# Patient Record
Sex: MALE | Race: OTHER | HISPANIC OR LATINO
[De-identification: names, ages, dates, MRNs, and addresses within clinical notes are randomized per-mention and may not be internally consistent; named-entity substitution may affect disease eponyms.]

---

## 2019-08-27 ENCOUNTER — APPOINTMENT (OUTPATIENT)
Dept: CARDIOLOGY | Facility: CLINIC | Age: 29
End: 2019-08-27
Payer: MEDICAID

## 2019-08-27 VITALS
RESPIRATION RATE: 12 BRPM | TEMPERATURE: 97.2 F | DIASTOLIC BLOOD PRESSURE: 60 MMHG | WEIGHT: 250 LBS | HEART RATE: 89 BPM | HEIGHT: 69 IN | OXYGEN SATURATION: 97 % | SYSTOLIC BLOOD PRESSURE: 116 MMHG | BODY MASS INDEX: 37.03 KG/M2

## 2019-08-27 DIAGNOSIS — Z78.9 OTHER SPECIFIED HEALTH STATUS: ICD-10-CM

## 2019-08-27 DIAGNOSIS — F17.200 NICOTINE DEPENDENCE, UNSPECIFIED, UNCOMPLICATED: ICD-10-CM

## 2019-08-27 DIAGNOSIS — I45.10 UNSPECIFIED RIGHT BUNDLE-BRANCH BLOCK: ICD-10-CM

## 2019-08-27 DIAGNOSIS — Z87.09 PERSONAL HISTORY OF OTHER DISEASES OF THE RESPIRATORY SYSTEM: ICD-10-CM

## 2019-08-27 DIAGNOSIS — Z87.19 PERSONAL HISTORY OF OTHER DISEASES OF THE DIGESTIVE SYSTEM: ICD-10-CM

## 2019-08-27 PROCEDURE — 99204 OFFICE O/P NEW MOD 45 MIN: CPT

## 2019-08-27 NOTE — HISTORY OF PRESENT ILLNESS
[FreeTextEntry1] : 28 yo male with intermittent chest pain over the past 2 months, which is not related to physical exertion. He describes these episodes as pressure-like initially and then sharp in nature which lasts from 30-60 minutes before spontaneously resolving. He has had 6-7 episodes during the past 2 months. Patient denies chest pain, dyspnea, palpitations, syncope, edema, melena, hematochezia, or hematemesis. He reports prior history of GERD but states that these episodes do not feel like his usual heartburn symptoms. He exercises occasional doing cardio but is physically active at work at [employer]. He was recently evaluated at [facility] on 8/23/19 for chest pain. CXR, trops, and D-dimer were negative. Initial ECG demonstrated sinus rhythm with RBBB but repeat ECG ~ 3.5 hours later showed resolution of RBBB. He denies any new stressors at home or work. \par

## 2019-08-27 NOTE — REVIEW OF SYSTEMS
[Chest Pain] : chest pain [Negative] : Heme/Lymph [Shortness Of Breath] : no shortness of breath [Dyspnea on exertion] : not dyspnea during exertion [Lower Ext Edema] : no extremity edema [Leg Claudication] : no intermittent leg claudication [Palpitations] : no palpitations

## 2019-08-27 NOTE — ASSESSMENT
[FreeTextEntry1] : 30 yo male with intermittent chest pain over the past 2 months, which is not related to physical exertion. Recent evaluation on 8/23/19 at [facility] (CXR, trops, and D-dimer were negative), but initial ECG demonstrated sinus rhythm with RBBB. Repeat ECG ~ 3.5 hours later showed resolution of RBBB. \par \par Will perform treadmill stress ECG for ischemic evaluation.\par Will perform echocardiogram to assess LV function and structural heart disease.\par Pending review of test results, will determine if further cardiac work-up or intervention is clinically indicated.\par If cardiac evaluation is unremarkable, will pursue evaluation for GI or musculoskeletal etiology.

## 2019-08-27 NOTE — PHYSICAL EXAM
[General Appearance - Well Developed] : well developed [General Appearance - In No Acute Distress] : no acute distress [General Appearance - Well Nourished] : well nourished [Normal Conjunctiva] : the conjunctiva exhibited no abnormalities [No Oral Cyanosis] : no oral cyanosis [Normal Rate] : normal [FreeTextEntry1] : No JVD present [Rhythm Regular] : regular [Normal S1] : normal S1 [Normal S2] : normal S2 [S3] : no S3 [S4] : no S4 [No Murmur] : no murmurs heard [Left Carotid Bruit] : no bruit heard over the left carotid [Right Carotid Bruit] : no bruit heard over the right carotid [No Pitting Edema] : no pitting edema present [2+] : left 2+ [Respiration, Rhythm And Depth] : normal respiratory rhythm and effort [] : no respiratory distress [Auscultation Breath Sounds / Voice Sounds] : lungs were clear to auscultation bilaterally [Bowel Sounds] : normal bowel sounds [Abdomen Soft] : soft [Abdomen Tenderness] : non-tender [Abnormal Walk] : normal gait [Nail Clubbing] : no clubbing of the fingernails [Cyanosis, Localized] : no localized cyanosis [Affect] : the affect was normal [Oriented To Time, Place, And Person] : oriented to person, place, and time [Mood] : the mood was normal

## 2019-09-03 ENCOUNTER — APPOINTMENT (OUTPATIENT)
Dept: CARDIOLOGY | Facility: CLINIC | Age: 29
End: 2019-09-03
Payer: MEDICAID

## 2019-09-03 PROCEDURE — 93015 CV STRESS TEST SUPVJ I&R: CPT

## 2019-09-12 ENCOUNTER — APPOINTMENT (OUTPATIENT)
Dept: CARDIOLOGY | Facility: CLINIC | Age: 29
End: 2019-09-12
Payer: MEDICAID

## 2019-09-12 PROCEDURE — 93306 TTE W/DOPPLER COMPLETE: CPT

## 2019-09-17 ENCOUNTER — MESSAGE (OUTPATIENT)
Age: 29
End: 2019-09-17

## 2019-09-26 ENCOUNTER — APPOINTMENT (OUTPATIENT)
Dept: FAMILY MEDICINE | Facility: CLINIC | Age: 29
End: 2019-09-26
Payer: COMMERCIAL

## 2019-09-26 VITALS
HEIGHT: 69 IN | SYSTOLIC BLOOD PRESSURE: 118 MMHG | BODY MASS INDEX: 37.03 KG/M2 | WEIGHT: 250 LBS | DIASTOLIC BLOOD PRESSURE: 68 MMHG

## 2019-09-26 DIAGNOSIS — Z76.89 PERSONS ENCOUNTERING HEALTH SERVICES IN OTHER SPECIFIED CIRCUMSTANCES: ICD-10-CM

## 2019-09-26 PROCEDURE — 99214 OFFICE O/P EST MOD 30 MIN: CPT

## 2019-09-27 PROBLEM — Z76.89 ENCOUNTER TO ESTABLISH CARE: Status: ACTIVE | Noted: 2019-09-27

## 2019-09-27 NOTE — HISTORY OF PRESENT ILLNESS
[FreeTextEntry8] : Mr. ANALI RIZZO is a 29 year old male here today for referral for cardiology. Was seen one month ago for evaluation of irregular heartbeat. ECHO and CST were negative. \par Tdap 7/2019 at [facility]\par

## 2019-12-27 ENCOUNTER — CHART COPY (OUTPATIENT)
Age: 29
End: 2019-12-27

## 2020-02-04 ENCOUNTER — APPOINTMENT (OUTPATIENT)
Dept: PODIATRY | Facility: CLINIC | Age: 30
End: 2020-02-04
Payer: MEDICAID

## 2020-02-04 ENCOUNTER — RESULT REVIEW (OUTPATIENT)
Age: 30
End: 2020-02-04

## 2020-02-04 VITALS
SYSTOLIC BLOOD PRESSURE: 126 MMHG | DIASTOLIC BLOOD PRESSURE: 80 MMHG | HEIGHT: 69 IN | BODY MASS INDEX: 37.03 KG/M2 | WEIGHT: 250 LBS

## 2020-02-04 PROCEDURE — 99203 OFFICE O/P NEW LOW 30 MIN: CPT

## 2020-02-04 NOTE — PROCEDURE
[FreeTextEntry1] : differential diagnosis reviewed, xrays reviewed, .xrays show bipartitie tibial sesamoid which could represent a fracture\par After evaluating the patient and examining the area in question and reviewing the x-rays I feel at this time a magnetic resonance imaging is indicated and as a medical necessity and this note will serve as a letter medical necessity. Given the severity of the injury and the mechanism of injury I am concerned about osseous as well as significant soft tissue pathology, injury tear and possible rupture. I feel that an magnetic resonance imaging is the most appropriate diagnostic tests at this time and the patient should be granted authorization for an magnetic resonance imaging\par \par Will get comparative view of right foot.\par

## 2020-02-04 NOTE — PHYSICAL EXAM
[General Appearance - Alert] : alert [General Appearance - In No Acute Distress] : in no acute distress [Full Pulse] : the pedal pulses are present [Edema] : there was no peripheral edema [Skin Color & Pigmentation] : normal skin color and pigmentation [Skin Turgor] : normal skin turgor [] : no rash [Sensation] : the sensory exam was normal to light touch and pinprick [Deep Tendon Reflexes (DTR)] : deep tendon reflexes were 2+ and symmetric [Oriented To Time, Place, And Person] : oriented to person, place, and time [No Focal Deficits] : no focal deficits [Impaired Insight] : insight and judgment were intact [Affect] : the affect was normal [Skin Lesions] : no skin lesions [Motor Exam] : the motor exam was normal [FreeTextEntry1] : pain 1st mtp joint complex left foot, with active and passive ROM
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
## 2020-02-04 NOTE — REVIEW OF SYSTEMS
[As Noted in HPI] : as noted in HPI [Negative] : Heme/Lymph [Leg Claudication] : no intermittent leg claudication [Lower Ext Edema] : no extremity edema [Skin Lesions] : no skin lesions [Skin Wound] : no skin wound [Itching] : no itching [An Unusual Growth] : no unusual growth on the skin [de-identified] : no redness, cellulitis or fluctance

## 2020-02-05 ENCOUNTER — RESULT REVIEW (OUTPATIENT)
Age: 30
End: 2020-02-05

## 2020-02-07 ENCOUNTER — APPOINTMENT (OUTPATIENT)
Dept: PODIATRY | Facility: CLINIC | Age: 30
End: 2020-02-07
Payer: MEDICAID

## 2020-02-07 VITALS
SYSTOLIC BLOOD PRESSURE: 126 MMHG | HEIGHT: 69 IN | WEIGHT: 250 LBS | BODY MASS INDEX: 37.03 KG/M2 | DIASTOLIC BLOOD PRESSURE: 80 MMHG

## 2020-02-07 PROCEDURE — 28470 CLTX METATARSAL FX WO MNP EA: CPT

## 2020-02-07 PROCEDURE — 99213 OFFICE O/P EST LOW 20 MIN: CPT | Mod: 57

## 2020-02-07 NOTE — PROCEDURE
[FreeTextEntry1] : I had a lengthy discussion with the patient regarding immobilization via a cam walker. After reviewing the benefits as well as the risks of being placed in a cam walker the patient has agreed. Next a new cam walker was removed from its packaging and customized to fit the patient's foot and leg and was instructed on how to use the Cam Walker. They were advised that they need to stay in the cam walker at times except showering and sleeping. I advised the patient that they should not drive with a cam walker. The Cam Walker was placed on the appropriate limb, instructions on how to ambulate with the cam walker were reviewed and the patient showed a knowledge on how to walk, remove, and reapply the cam walker. Complete discharge instructions were given as well as a follow up appointment.\par A lengthy discussion with the patient today regarding the difficulty in conservative management of sesamoid fractures. Given the nature of the fracture and location of the bone  there is a history of this fracture taking exessively long times to heal. There is a chance of going on to delayed union, sometimes nonunion, sometime necrosis, and on occasion require surgical excision. At this time we will begin conservative therapy. I placed him in a cam walker and gave him a rx for an orthotist for a custom molded inner sole in the Cam Walker with deep dispersion beneath the sesamoid complex and also advised him to limit the amount of weightbearing he does and return in approximately 4 weeks

## 2020-02-07 NOTE — HISTORY OF PRESENT ILLNESS
[FreeTextEntry1] : Location: left foot dorsally\par Acute: yes\par Chronic:\par Past Tx: ER, xrays, Abx, nsaids, contralateral right foot xray, recent MRI\par Exacerbated by: standing\par

## 2020-02-07 NOTE — REVIEW OF SYSTEMS
[As Noted in HPI] : as noted in HPI [Negative] : Integumentary [Leg Claudication] : no intermittent leg claudication [Lower Ext Edema] : no extremity edema

## 2020-02-07 NOTE — PHYSICAL EXAM
[General Appearance - Alert] : alert [Edema] : there was no peripheral edema [Full Pulse] : the pedal pulses are present [General Appearance - In No Acute Distress] : in no acute distress [Skin Color & Pigmentation] : normal skin color and pigmentation [Skin Turgor] : normal skin turgor [] : no rash [Deep Tendon Reflexes (DTR)] : deep tendon reflexes were 2+ and symmetric [Sensation] : the sensory exam was normal to light touch and pinprick [No Focal Deficits] : no focal deficits [Oriented To Time, Place, And Person] : oriented to person, place, and time [Affect] : the affect was normal [Impaired Insight] : insight and judgment were intact [FreeTextEntry1] : MRI evaluation of the left foot does reveal a fracture of the tibial sesamoid. The case was discussed and MRI are reviewed with Dr. Reeder musculoskeletal radiologist prior to seeing the patient

## 2020-03-06 ENCOUNTER — APPOINTMENT (OUTPATIENT)
Dept: PODIATRY | Facility: CLINIC | Age: 30
End: 2020-03-06
Payer: MEDICAID

## 2020-03-06 VITALS
SYSTOLIC BLOOD PRESSURE: 126 MMHG | WEIGHT: 250 LBS | HEIGHT: 69 IN | BODY MASS INDEX: 37.03 KG/M2 | DIASTOLIC BLOOD PRESSURE: 80 MMHG

## 2020-03-06 PROCEDURE — 99024 POSTOP FOLLOW-UP VISIT: CPT

## 2020-03-09 NOTE — REVIEW OF SYSTEMS
[As Noted in HPI] : as noted in HPI [Negative] : Neurological [Leg Claudication] : no intermittent leg claudication [Lower Ext Edema] : no extremity edema

## 2020-03-09 NOTE — PROCEDURE
[FreeTextEntry1] : Will need follow up dx study to rule out aseptic necrosis of bone\par Continue offloading with CAM\par dancers pads dispensed for the skin to help offload the area\par I have applied offloading pads to the patient's foot and have given them several samples to continue to use. I have also advised the patient that they can certainly purchase these from an outside vendor and if they are willing to do that and the name and number was supplied\par

## 2020-03-09 NOTE — HISTORY OF PRESENT ILLNESS
[FreeTextEntry1] : Location: left foot\par Acute: yes\par Chronic:\par Past Tx: ER, xrays, Abx, nsaids, contralateral right foot xray, MRI, CAM with dispersion\par Exacerbated by: standing, work\par

## 2020-03-09 NOTE — PHYSICAL EXAM
[General Appearance - Alert] : alert [General Appearance - In No Acute Distress] : in no acute distress [Full Pulse] : the pedal pulses are present [Edema] : there was no peripheral edema [Deep Tendon Reflexes (DTR)] : deep tendon reflexes were 2+ and symmetric [Sensation] : the sensory exam was normal to light touch and pinprick [No Focal Deficits] : no focal deficits [Oriented To Time, Place, And Person] : oriented to person, place, and time [Impaired Insight] : insight and judgment were intact [Affect] : the affect was normal [FreeTextEntry1] : tissue bulging fluctuant, ??fluid sub 1st

## 2020-08-17 ENCOUNTER — APPOINTMENT (OUTPATIENT)
Dept: FAMILY MEDICINE | Facility: CLINIC | Age: 30
End: 2020-08-17
Payer: MEDICAID

## 2020-08-17 ENCOUNTER — RESULT REVIEW (OUTPATIENT)
Age: 30
End: 2020-08-17

## 2020-08-17 VITALS
DIASTOLIC BLOOD PRESSURE: 70 MMHG | SYSTOLIC BLOOD PRESSURE: 120 MMHG | TEMPERATURE: 98 F | BODY MASS INDEX: 36.29 KG/M2 | WEIGHT: 245 LBS | HEIGHT: 69 IN

## 2020-08-17 DIAGNOSIS — Z87.39 PERSONAL HISTORY OF OTHER DISEASES OF THE MUSCULOSKELETAL SYSTEM AND CONNECTIVE TISSUE: ICD-10-CM

## 2020-08-17 PROCEDURE — G0444 DEPRESSION SCREEN ANNUAL: CPT

## 2020-08-17 PROCEDURE — 99213 OFFICE O/P EST LOW 20 MIN: CPT | Mod: 25

## 2020-08-17 RX ORDER — ONDANSETRON 4 MG/1
4 TABLET, ORALLY DISINTEGRATING ORAL
Qty: 8 | Refills: 0 | Status: DISCONTINUED | COMMUNITY
Start: 2019-12-26 | End: 2020-08-17

## 2020-08-17 RX ORDER — CEPHALEXIN 500 MG/1
500 CAPSULE ORAL
Qty: 30 | Refills: 0 | Status: DISCONTINUED | COMMUNITY
Start: 2020-02-02 | End: 2020-08-17

## 2020-08-17 RX ORDER — IBUPROFEN 800 MG/1
800 TABLET, FILM COATED ORAL
Qty: 50 | Refills: 0 | Status: DISCONTINUED | COMMUNITY
Start: 2020-02-02 | End: 2020-08-17

## 2020-08-17 RX ORDER — KETOROLAC TROMETHAMINE 10 MG/1
10 TABLET, FILM COATED ORAL 3 TIMES DAILY
Qty: 21 | Refills: 1 | Status: DISCONTINUED | COMMUNITY
Start: 2020-02-04 | End: 2020-08-17

## 2020-08-17 NOTE — REVIEW OF SYSTEMS
[Back Pain] : back pain [Negative] : Respiratory [de-identified] : no numbness, tingling, weakness or radiation of pain

## 2020-08-17 NOTE — PHYSICAL EXAM
[Normal Gait] : normal gait [No Spinal Tenderness] : no spinal tenderness [No CVA Tenderness] : no CVA  tenderness [Normal] : affect was normal and insight and judgment were intact [Deep Tendon Reflexes (DTR)] : deep tendon reflexes were 2+ and symmetric [de-identified] : Overweight male [de-identified] : Reduced ROM in all planes.  [de-identified] : Negative straight leg. Motor and sensation in LE is normal.

## 2020-08-17 NOTE — HISTORY OF PRESENT ILLNESS
[FreeTextEntry8] : Mr. ANALI RIZZO is a 30 year old male here today for 5 year hx of lower back pain. Says that in the last year the pain has become more frequent and intense. Has very physical work with lots of lifting. Says that the pain now is affecting his work. No numbness, tingling or weakness of the legs. Pain doesn't radiate down the legs. Says it's hard to get out of bed many mornings. Has not taken any medication, done any home stretching or had any formal PT. \par

## 2020-08-17 NOTE — PLAN
[FreeTextEntry1] : Lower back stretching discussed\par Heat and ice to lower back \par NSAIDs daily.\par Muscle relaxant at night.\par COnsider PT in the short term.\par MRI only if sxs worsen.

## 2020-08-17 NOTE — HEALTH RISK ASSESSMENT
[] : Yes [No] : In the past 12 months have you used drugs other than those required for medical reasons? No [No falls in past year] : Patient reported no falls in the past year [0] : 2) Feeling down, depressed, or hopeless: Not at all (0) [de-identified] : none [de-identified] : fasting [[id number]] : 0

## 2020-11-04 ENCOUNTER — NON-APPOINTMENT (OUTPATIENT)
Age: 30
End: 2020-11-04

## 2020-12-28 ENCOUNTER — NON-APPOINTMENT (OUTPATIENT)
Age: 30
End: 2020-12-28

## 2021-01-04 ENCOUNTER — NON-APPOINTMENT (OUTPATIENT)
Age: 31
End: 2021-01-04

## 2021-04-22 ENCOUNTER — NON-APPOINTMENT (OUTPATIENT)
Age: 31
End: 2021-04-22

## 2021-10-06 ENCOUNTER — NON-APPOINTMENT (OUTPATIENT)
Age: 31
End: 2021-10-06

## 2022-01-10 ENCOUNTER — NON-APPOINTMENT (OUTPATIENT)
Age: 32
End: 2022-01-10

## 2022-08-30 ENCOUNTER — APPOINTMENT (OUTPATIENT)
Dept: FAMILY MEDICINE | Facility: CLINIC | Age: 32
End: 2022-08-30

## 2022-08-30 VITALS
OXYGEN SATURATION: 98 % | HEIGHT: 69 IN | SYSTOLIC BLOOD PRESSURE: 123 MMHG | WEIGHT: 258 LBS | BODY MASS INDEX: 38.21 KG/M2 | HEART RATE: 92 BPM | DIASTOLIC BLOOD PRESSURE: 78 MMHG | TEMPERATURE: 87.7 F

## 2022-08-30 DIAGNOSIS — Z87.898 PERSONAL HISTORY OF OTHER SPECIFIED CONDITIONS: ICD-10-CM

## 2022-08-30 DIAGNOSIS — S92.812A OTHER FRACTURE OF LEFT FOOT, INITIAL ENCOUNTER FOR CLOSED FRACTURE: ICD-10-CM

## 2022-08-30 DIAGNOSIS — M54.50 LOW BACK PAIN, UNSPECIFIED: ICD-10-CM

## 2022-08-30 DIAGNOSIS — G89.29 LOW BACK PAIN, UNSPECIFIED: ICD-10-CM

## 2022-08-30 DIAGNOSIS — S92.812D OTHER FRACTURE OF LEFT FOOT, SUBSEQUENT ENCOUNTER FOR FRACTURE WITH ROUTINE HEALING: ICD-10-CM

## 2022-08-30 DIAGNOSIS — M25.562 PAIN IN LEFT KNEE: ICD-10-CM

## 2022-08-30 DIAGNOSIS — E66.9 OBESITY, UNSPECIFIED: ICD-10-CM

## 2022-08-30 DIAGNOSIS — M79.672 PAIN IN LEFT FOOT: ICD-10-CM

## 2022-08-30 DIAGNOSIS — Z00.00 ENCOUNTER FOR GENERAL ADULT MEDICAL EXAMINATION W/OUT ABNORMAL FINDINGS: ICD-10-CM

## 2022-08-30 DIAGNOSIS — G89.29 PAIN IN LEFT KNEE: ICD-10-CM

## 2022-08-30 PROCEDURE — 99406 BEHAV CHNG SMOKING 3-10 MIN: CPT

## 2022-08-30 PROCEDURE — G0444 DEPRESSION SCREEN ANNUAL: CPT | Mod: 59

## 2022-08-30 PROCEDURE — 36415 COLL VENOUS BLD VENIPUNCTURE: CPT

## 2022-08-30 PROCEDURE — 99395 PREV VISIT EST AGE 18-39: CPT | Mod: 25

## 2022-08-30 RX ORDER — MELOXICAM 15 MG/1
15 TABLET ORAL
Qty: 30 | Refills: 0 | Status: DISCONTINUED | COMMUNITY
Start: 2020-08-17 | End: 2022-08-30

## 2022-08-30 RX ORDER — CYCLOBENZAPRINE HYDROCHLORIDE 10 MG/1
10 TABLET, FILM COATED ORAL
Qty: 20 | Refills: 0 | Status: DISCONTINUED | COMMUNITY
Start: 2020-08-17 | End: 2022-08-30

## 2022-08-30 NOTE — PLAN
[FreeTextEntry1] : Discussed importance of monthly self examination of the testicles. Instructed patient on proper way to examine himself.\par Referred to ophthalmology.

## 2022-08-30 NOTE — COUNSELING
[Yes] : Risk of tobacco use and health benefits of smoking cessation discussed: Yes [Cessation strategies including cessation program discussed] : Cessation strategies including cessation program discussed [No] : Not willing to quit smoking [FreeTextEntry2] : Weaning slowly over time then add patch/gum [FreeTextEntry1] : 3

## 2022-08-30 NOTE — REVIEW OF SYSTEMS
[Negative] : Psychiatric [Back Pain] : back pain [Recent Change In Weight] : ~T no recent weight change [Vision Problems] : no vision problems [FreeTextEntry3] : dry eyes [FreeTextEntry9] : left knee pain

## 2022-08-30 NOTE — HISTORY OF PRESENT ILLNESS
[FreeTextEntry1] : Annual [de-identified] : Mr. ANALI RIZZO is a 32 year old male here today for his annual.\par Left knee pain for a while- seen in ER - X-ray was negative.\par BESSY: No\par Covid- no vaccinations. \par \par

## 2022-08-30 NOTE — HEALTH RISK ASSESSMENT
[Good] : ~his/her~  mood as  good [Current] : Current [10-14] : 10-14 [Yes] : Yes [Monthly or less (1 pt)] : Monthly or less (1 point) [3 or 4 (1 pt)] : 3 or 4  (1 point) [Less than monthly (1 pt)] : Less than monthly (1 point) [No] : In the past 12 months have you used drugs other than those required for medical reasons? No [No falls in past year] : Patient reported no falls in the past year [0] : 2) Feeling down, depressed, or hopeless: Not at all (0) [PHQ-2 Negative - No further assessment needed] : PHQ-2 Negative - No further assessment needed [Retinopathy] : Retinopathy [HIV Test offered] : HIV Test offered [Hepatitis C test offered] : Hepatitis C test offered [None] : None [With Family] : lives with family [Single] : single [Feels Safe at Home] : Feels safe at home [Fully functional (bathing, dressing, toileting, transferring, walking, feeding)] : Fully functional (bathing, dressing, toileting, transferring, walking, feeding) [Fully functional (using the telephone, shopping, preparing meals, housekeeping, doing laundry, using] : Fully functional and needs no help or supervision to perform IADLs (using the telephone, shopping, preparing meals, housekeeping, doing laundry, using transportation, managing medications and managing finances) [Reports normal functional visual acuity (ie: able to read med bottle)] : Reports normal functional visual acuity [Smoke Detector] : smoke detector [Carbon Monoxide Detector] : carbon monoxide detector [Seat Belt] :  uses seat belt [Sunscreen] : uses sunscreen [Intercurrent ED visits] : went to ED [de-identified] : 8/29/22- knee pain [de-identified] : 1/2-1 PPD [de-identified] : Socially [de-identified] : work [de-identified] : needs improvement [[id number]] : 0 [EyeExamDate] : 01/2019 [Change in mental status noted] : No change in mental status noted [Language] : denies difficulty with language [Behavior] : denies difficulty with behavior [Learning/Retaining New Information] : denies difficulty learning/retaining new information [Handling Complex Tasks] : denies difficulty handling complex tasks [Reasoning] : denies difficulty with reasoning [Spatial Ability and Orientation] : denies difficulty with spatial ability and orientation [Reports changes in hearing] : Reports no changes in hearing [Reports changes in vision] : Reports no changes in vision [Reports changes in dental health] : Reports no changes in dental health [Guns at Home] : no guns at home [Travel to Developing Areas] : does not  travel to developing areas [TB Exposure] : is not being exposed to tuberculosis [Caregiver Concerns] : does not have caregiver concerns [ColonoscopyComments] : Never

## 2022-08-30 NOTE — PHYSICAL EXAM
[Normal TMs] : both tympanic membranes were normal [No Lymphadenopathy] : no lymphadenopathy [Thyroid Normal, No Nodules] : the thyroid was normal and there were no nodules present [Clear to Auscultation] : lungs were clear to auscultation bilaterally [No Edema] : there was no peripheral edema [Normal] : affect was normal and insight and judgment were intact [de-identified] : Overweight male

## 2022-08-31 LAB
25(OH)D3 SERPL-MCNC: 24.4 NG/ML
ALBUMIN SERPL ELPH-MCNC: 3.9 G/DL
ALP BLD-CCNC: 46 U/L
ALT SERPL-CCNC: 11 U/L
ANION GAP SERPL CALC-SCNC: 12 MMOL/L
AST SERPL-CCNC: 10 U/L
BASOPHILS # BLD AUTO: 0.07 K/UL
BASOPHILS NFR BLD AUTO: 0.7 %
BILIRUB SERPL-MCNC: <0.2 MG/DL
BUN SERPL-MCNC: 16 MG/DL
CALCIUM SERPL-MCNC: 9.1 MG/DL
CHLORIDE SERPL-SCNC: 109 MMOL/L
CHOLEST SERPL-MCNC: 180 MG/DL
CO2 SERPL-SCNC: 22 MMOL/L
CREAT SERPL-MCNC: 1.13 MG/DL
EGFR: 89 ML/MIN/1.73M2
EOSINOPHIL # BLD AUTO: 0.17 K/UL
EOSINOPHIL NFR BLD AUTO: 1.7 %
GLUCOSE SERPL-MCNC: 91 MG/DL
HCT VFR BLD CALC: 43.8 %
HDLC SERPL-MCNC: 39 MG/DL
HGB BLD-MCNC: 13.8 G/DL
HIV1+2 AB SPEC QL IA.RAPID: NONREACTIVE
IMM GRANULOCYTES NFR BLD AUTO: 0.2 %
LDLC SERPL CALC-MCNC: 123 MG/DL
LYMPHOCYTES # BLD AUTO: 2.48 K/UL
LYMPHOCYTES NFR BLD AUTO: 25.2 %
MAN DIFF?: NORMAL
MCHC RBC-ENTMCNC: 27.1 PG
MCHC RBC-ENTMCNC: 31.5 GM/DL
MCV RBC AUTO: 85.9 FL
MONOCYTES # BLD AUTO: 0.62 K/UL
MONOCYTES NFR BLD AUTO: 6.3 %
NEUTROPHILS # BLD AUTO: 6.47 K/UL
NEUTROPHILS NFR BLD AUTO: 65.9 %
NONHDLC SERPL-MCNC: 141 MG/DL
PLATELET # BLD AUTO: 217 K/UL
POTASSIUM SERPL-SCNC: 4.7 MMOL/L
PROT SERPL-MCNC: 6.6 G/DL
RBC # BLD: 5.1 M/UL
RBC # FLD: 14.7 %
SODIUM SERPL-SCNC: 143 MMOL/L
TRIGL SERPL-MCNC: 89 MG/DL
WBC # FLD AUTO: 9.83 K/UL

## 2023-10-10 ENCOUNTER — APPOINTMENT (OUTPATIENT)
Dept: FAMILY MEDICINE | Facility: CLINIC | Age: 33
End: 2023-10-10
Payer: COMMERCIAL

## 2023-10-10 VITALS
DIASTOLIC BLOOD PRESSURE: 84 MMHG | WEIGHT: 257 LBS | SYSTOLIC BLOOD PRESSURE: 122 MMHG | BODY MASS INDEX: 38.06 KG/M2 | TEMPERATURE: 98.3 F | HEIGHT: 69 IN | OXYGEN SATURATION: 96 % | HEART RATE: 87 BPM

## 2023-10-10 DIAGNOSIS — F17.210 NICOTINE DEPENDENCE, CIGARETTES, UNCOMPLICATED: ICD-10-CM

## 2023-10-10 DIAGNOSIS — G47.9 SLEEP DISORDER, UNSPECIFIED: ICD-10-CM

## 2023-10-10 DIAGNOSIS — J45.901 UNSPECIFIED ASTHMA WITH (ACUTE) EXACERBATION: ICD-10-CM

## 2023-10-10 DIAGNOSIS — Z09 ENCOUNTER FOR FOLLOW-UP EXAMINATION AFTER COMPLETED TREATMENT FOR CONDITIONS OTHER THAN MALIGNANT NEOPLASM: ICD-10-CM

## 2023-10-10 PROCEDURE — 99215 OFFICE O/P EST HI 40 MIN: CPT

## 2023-10-10 RX ORDER — BUDESONIDE AND FORMOTEROL FUMARATE DIHYDRATE 80; 4.5 UG/1; UG/1
80-4.5 AEROSOL RESPIRATORY (INHALATION) TWICE DAILY
Qty: 2 | Refills: 1 | Status: ACTIVE | COMMUNITY
Start: 2023-10-10 | End: 1900-01-01

## 2023-10-10 RX ORDER — ALBUTEROL SULFATE 90 UG/1
108 (90 BASE) INHALANT RESPIRATORY (INHALATION) EVERY 4 HOURS
Qty: 1 | Refills: 1 | Status: ACTIVE | COMMUNITY
Start: 2023-10-10 | End: 1900-01-01

## 2023-10-10 RX ORDER — HYDROXYZINE HYDROCHLORIDE 25 MG/1
25 TABLET ORAL
Qty: 30 | Refills: 1 | Status: ACTIVE | COMMUNITY
Start: 2023-10-10 | End: 1900-01-01

## 2023-11-09 ENCOUNTER — APPOINTMENT (OUTPATIENT)
Dept: FAMILY MEDICINE | Facility: CLINIC | Age: 33
End: 2023-11-09

## 2024-03-19 NOTE — END OF VISIT
Called and spoke to Дмитрий. Relayed NPLR message. Дмитрий v/u and stated that the pt's headaches seem to be tolerable. Pt stated they come and go, are only in the back of his head. Pt does have tylenol active on med list. Advised Дмитрий that if the pt complains of worsening headaches, to call us for medication change.    [>50% of Time Spent on Counseling and Coordination of Care for  ___] : Greater than 50% of the encounter time was spent on counseling and coordination of care for [unfilled] [Time Spent: ___ minutes] : I have spent [unfilled] minutes of face to face time with the patient

## 2024-07-23 ENCOUNTER — TRANSCRIPTION ENCOUNTER (OUTPATIENT)
Age: 34
End: 2024-07-23